# Patient Record
Sex: FEMALE | Race: BLACK OR AFRICAN AMERICAN
[De-identification: names, ages, dates, MRNs, and addresses within clinical notes are randomized per-mention and may not be internally consistent; named-entity substitution may affect disease eponyms.]

---

## 2022-12-30 ENCOUNTER — DASHBOARD ENCOUNTERS (OUTPATIENT)
Age: 67
End: 2022-12-30

## 2022-12-30 ENCOUNTER — OFFICE VISIT (OUTPATIENT)
Dept: URBAN - NONMETROPOLITAN AREA CLINIC 2 | Facility: CLINIC | Age: 67
End: 2022-12-30
Payer: COMMERCIAL

## 2022-12-30 ENCOUNTER — WEB ENCOUNTER (OUTPATIENT)
Dept: URBAN - NONMETROPOLITAN AREA CLINIC 2 | Facility: CLINIC | Age: 67
End: 2022-12-30

## 2022-12-30 ENCOUNTER — TELEPHONE ENCOUNTER (OUTPATIENT)
Dept: URBAN - NONMETROPOLITAN AREA CLINIC 2 | Facility: CLINIC | Age: 67
End: 2022-12-30

## 2022-12-30 VITALS
TEMPERATURE: 98.3 F | HEART RATE: 94 BPM | SYSTOLIC BLOOD PRESSURE: 170 MMHG | BODY MASS INDEX: 34.55 KG/M2 | HEIGHT: 66 IN | DIASTOLIC BLOOD PRESSURE: 82 MMHG | WEIGHT: 215 LBS

## 2022-12-30 DIAGNOSIS — Z12.11 COLON CANCER SCREENING: ICD-10-CM

## 2022-12-30 DIAGNOSIS — K59.01 SLOW TRANSIT CONSTIPATION: ICD-10-CM

## 2022-12-30 PROBLEM — 35298007: Status: ACTIVE | Noted: 2022-12-30

## 2022-12-30 PROCEDURE — 99204 OFFICE O/P NEW MOD 45 MIN: CPT | Performed by: NURSE PRACTITIONER

## 2022-12-30 RX ORDER — FENOFIBRATE 54 MG/1
TAKE ONE TABLET BY MOUTH IN THE EVENING TABLET ORAL
Qty: 90 UNSPECIFIED | Refills: 0 | Status: ACTIVE | COMMUNITY

## 2022-12-30 RX ORDER — FLUTICASONE PROPIONATE 50 UG/1
USE ONE SPRAY IN EACH NOSTRIL TWICE DAILY SPRAY, METERED NASAL
Qty: 16 UNSPECIFIED | Refills: 1 | Status: ACTIVE | COMMUNITY

## 2022-12-30 RX ORDER — SODIUM SULFATE, POTASSIUM SULFATE, MAGNESIUM SULFATE 1.6; 3.13; 17.5 G/177ML; G/177ML; G/177ML
177ML SOLUTION ORAL
Qty: 708 | OUTPATIENT
Start: 2022-12-30 | End: 2023-01-01

## 2022-12-30 RX ORDER — CYCLOBENZAPRINE HYDROCHLORIDE 10 MG/1
TAKE ONE TABLET BY MOUTH ONE TIME DAILY AS NEEDED FOR PAIN TABLET, FILM COATED ORAL
Qty: 90 UNSPECIFIED | Refills: 0 | Status: ACTIVE | COMMUNITY

## 2022-12-30 RX ORDER — SIMVASTATIN 80 MG/1
TAKE ONE TABLET BY MOUTH ONE TIME DAILY TABLET, FILM COATED ORAL
Qty: 90 UNSPECIFIED | Refills: 0 | Status: ACTIVE | COMMUNITY

## 2022-12-30 RX ORDER — GLIPIZIDE 10 MG/1
TAKE ONE TABLET BY MOUTH TWICE A DAY TABLET ORAL
Qty: 180 UNSPECIFIED | Refills: 0 | Status: ACTIVE | COMMUNITY

## 2022-12-30 RX ORDER — FLUTICASONE FUROATE AND VILANTEROL TRIFENATATE 100; 25 UG/1; UG/1
INHALE ONE PUFF BY MOUTH ONE TIME DAILY AS DIRECTED POWDER RESPIRATORY (INHALATION)
Qty: 180 UNSPECIFIED | Refills: 0 | Status: ACTIVE | COMMUNITY

## 2022-12-30 RX ORDER — AMLODIPINE BESYLATE 5 MG/1
TAKE ONE TABLET BY MOUTH IN THE EVENING TABLET ORAL
Qty: 30 UNSPECIFIED | Refills: 1 | Status: ACTIVE | COMMUNITY

## 2022-12-30 RX ORDER — LOSARTAN POTASSIUM 100 MG/1
TAKE ONE TABLET BY MOUTH EVERY MORNING TABLET, FILM COATED ORAL
Qty: 90 UNSPECIFIED | Refills: 1 | Status: ACTIVE | COMMUNITY

## 2022-12-30 RX ORDER — ALBUTEROL SULFATE 90 UG/1
AEROSOL, METERED RESPIRATORY (INHALATION)
Qty: 8.5 GRAM | Status: ACTIVE | COMMUNITY

## 2022-12-30 RX ORDER — CELECOXIB 200 MG/1
1 CAPSULE WITH FOOD CAPSULE ORAL ONCE A DAY
Qty: 30 | Status: ACTIVE | COMMUNITY
Start: 2022-12-30 | End: 2023-01-29

## 2022-12-30 NOTE — HPI-TODAY'S VISIT:
Patient is a 67-year-old female who presents for colonoscopy.  Denies current GI complaints.  She did state a month or so ago, she had some constipation and she is started taking some fiber and this seems to have improved.  No additional complaints.  Past medical history is pertinent for diabetes and high blood pressure.  Last colonoscopy over 10 years ago.  Does not take anticoagulation sb

## 2023-01-30 ENCOUNTER — OFFICE VISIT (OUTPATIENT)
Dept: URBAN - METROPOLITAN AREA MEDICAL CENTER 1 | Facility: MEDICAL CENTER | Age: 68
End: 2023-01-30
Payer: COMMERCIAL

## 2023-01-30 DIAGNOSIS — Z12.11 COLON CANCER SCREENING: ICD-10-CM

## 2023-01-30 PROCEDURE — G0121 COLON CA SCRN NOT HI RSK IND: HCPCS | Performed by: INTERNAL MEDICINE
